# Patient Record
Sex: FEMALE | Race: WHITE | Employment: PART TIME | URBAN - METROPOLITAN AREA
[De-identification: names, ages, dates, MRNs, and addresses within clinical notes are randomized per-mention and may not be internally consistent; named-entity substitution may affect disease eponyms.]

---

## 2017-01-27 RX ORDER — NORETHINDRONE ACETATE AND ETHINYL ESTRADIOL 1MG-20(21)
1 KIT ORAL DAILY
Qty: 28 TAB | Refills: 0 | Status: SHIPPED | OUTPATIENT
Start: 2017-01-27 | End: 2017-02-20 | Stop reason: ALTCHOICE

## 2017-01-27 NOTE — TELEPHONE ENCOUNTER
This nurse called the patient to check on status of prescription. Patient advised of appointment and stated she can not do the 2/15/17 appointment. This nurse rescheduled the appointment for 2/14/17 at 9:20am. Prescription sent as per MD order to patient preferred pharmacy for one month supply. Patient advised of need to keep appointment in order to get more refills on her prescription. Patient verbalized understanding.

## 2017-01-27 NOTE — TELEPHONE ENCOUNTER
From: Denilson Ramey  To: Shelby Linares MD  Sent: 1/27/2017 8:48 AM EST  Subject: Medication Renewal Request    Original authorizing provider: MD Denilson Vargas would like a refill of the following medications:  norethindrone-ethinyl estradiol (LOESTRIN FE 1/20, 28-DAY,) 1 mg-20 mcg (21)/75 mg (7) tab Shelby Linares MD]    Preferred pharmacy: [facility] #[id number] - [address], [address] [facility],Third Floor:

## 2017-02-08 ENCOUNTER — TELEPHONE (OUTPATIENT)
Dept: OBGYN CLINIC | Age: 22
End: 2017-02-08

## 2017-02-08 NOTE — TELEPHONE ENCOUNTER
----- Message from Parth Ange sent at 5/12/2016  4:12 PM EDT -----  Regarding: Rpt Labs - June 2016  Notes Recorded by Ethel Lorenzo on 5/12/2016 at 4:12 PM  Read by Ama Degroot at 4/8/2016  6:40 PM    Notes Recorded by Don Manjarrez MD on 5/12/2016 at 2:11 PM  Please tickle for June - rpt labs. Notes Recorded by Don Manjarrez MD on 4/8/2016 at 6:38 PM  Results released to My Chart. \"Your labs look OK.  The DHEA-S is a little elevated which we can see if you are not ovulating. The testosterone levels are borderline but not elevated to the range that indicates a problem.  If you would like, we can repeat lab work in another couple of months to make sure things are stable. \"

## 2017-02-20 ENCOUNTER — OFFICE VISIT (OUTPATIENT)
Dept: OBGYN CLINIC | Age: 22
End: 2017-02-20

## 2017-02-20 VITALS
HEART RATE: 61 BPM | HEIGHT: 60 IN | BODY MASS INDEX: 20.81 KG/M2 | DIASTOLIC BLOOD PRESSURE: 59 MMHG | SYSTOLIC BLOOD PRESSURE: 102 MMHG | WEIGHT: 106 LBS

## 2017-02-20 DIAGNOSIS — Z01.419 ENCOUNTER FOR GYNECOLOGICAL EXAMINATION: Primary | ICD-10-CM

## 2017-02-20 DIAGNOSIS — L68.0 HIRSUTISM: ICD-10-CM

## 2017-02-20 DIAGNOSIS — Z30.41 SURVEILLANCE FOR BIRTH CONTROL, ORAL CONTRACEPTIVES: ICD-10-CM

## 2017-02-20 RX ORDER — ETONOGESTREL AND ETHINYL ESTRADIOL 11.7; 2.7 MG/1; MG/1
INSERT, EXTENDED RELEASE VAGINAL
Qty: 3 EACH | Refills: 4 | Status: SHIPPED | OUTPATIENT
Start: 2017-02-20 | End: 2017-03-06 | Stop reason: ALTCHOICE

## 2017-02-20 RX ORDER — ETONOGESTREL AND ETHINYL ESTRADIOL 11.7; 2.7 MG/1; MG/1
INSERT, EXTENDED RELEASE VAGINAL
Qty: 1 DEVICE | Refills: 0
Start: 2017-02-20 | End: 2017-03-06 | Stop reason: ALTCHOICE

## 2017-02-20 NOTE — PROGRESS NOTES
Annual exam ages 21-44    Gabe Turner is a ,  25 y.o. female [facility] Patient's last menstrual period was 2017., who presents for her annual checkup. Since her last annual GYN exam about one year ago on 16, she has had the following changes in her health history: None     Had Nexplanon 7/15 -> removed 2016 due to irregular bleeding. Switched to OCPs. Doing well, but does sometimes forget, may miss a day or two. Works at [employer] , shifts are variable. Currently in 3rd week of pill pack. H/o hirsutisism    ADDITIONAL CONCERNS:  She is having no significant problems. With regard to the Gardasil vaccine, she has not received it yet. Menstrual status:    Her periods are spotting/light in flow. She is using one to two pads or tampons per day, usually regular and last 26-30 days. She denies dysmenorrhea. She denies premenstrual symptoms. Contraception:    The current method of family planning is OCP (estrogen/progesterone). LE 1    Sexual history:     She  reports that she currently engages in sexual activity and has had male partners. She reports using the following method of birth control/protection: Pill. Pap and Mammogram History:    Has never had a pap smear. The patient has never had a mammogram.    Breast Cancer History/Substance Abuse: No [facility] w/BR [facility]    Past Medical History   Diagnosis Date    Asthma     Nexplanon removal      Inserted 2015 and Removed 2016     History reviewed. No pertinent past surgical history. OB History    Para Term  AB SAB TAB Ectopic Multiple Living   0 0 0 0 0 0 0 0 0 0             Current Outpatient Prescriptions   Medication Sig Dispense Refill    norethindrone-ethinyl estradiol (LOESTRIN FE , 28-DAY,) 1 mg-20 mcg (21)/75 mg (7) tab Take 1 Tab by mouth daily. 28 Tab 0     Allergies: Review of patient's allergies indicates no known allergies.    Social History     Social History    Marital status: SINGLE     Spouse name: N/A    Number of children: N/A    Years of education: N/A     Occupational History    Not on file. Social History Main Topics    Smoking status: Current Every Day Smoker     Types: Cigarettes    Smokeless tobacco: Never Used      Comment: Smokes 4-5 cigs per day - Uses vapor occasional     Alcohol use No    Drug use: No    Sexual activity: Yes     Partners: Male     Birth control/ protection: Pill     Other Topics Concern    Not on file     Social History Narrative     Tobacco History:  reports that she has been smoking Cigarettes. She has never used smokeless tobacco.  Alcohol Abuse:  reports that she does not drink alcohol. Drug Abuse:  reports that she does not use illicit drugs. There is no problem list on file for this patient.     Family History   Problem Relation Age of Onset    Hypertension Mother        Review of Systems - History obtained from the patient  Constitutional: negative for weight loss, fever, night sweats  HEENT: negative for hearing loss, earache, congestion, snoring, sorethroat  CV: negative for chest pain, palpitations, edema  Resp: negative for cough, shortness of breath, wheezing  GI: negative for change in bowel habits, abdominal pain, black or bloody stools  : negative for frequency, dysuria, hematuria, vaginal discharge  MSK: negative for back pain, joint pain, muscle pain  Breast: negative for breast lumps, nipple discharge, galactorrhea  Skin :negative for itching, rash, hives  Neuro: negative for dizziness, headache, confusion, weakness  Psych: negative for anxiety, depression, change in mood  Heme/lymph: negative for bleeding, bruising, pallor    Physical Exam    Visit Vitals    /59    Pulse 61    Ht 5' (1.524 m)    Wt 106 lb (48.1 kg)    LMP 02/18/2017  Comment: Spotting today     BMI 20.7 kg/m2       Constitutional  · Appearance: well-nourished, well developed, alert, in no acute distress    HENT  · Head and Face: appears normal    Neck  · Inspection/Palpation: normal appearance, no masses or tenderness  · Lymph Nodes: no lymphadenopathy present  · Thyroid: gland size normal, nontender, no nodules or masses present on palpation    Chest  · Respiratory Effort: breathing unlabored  · Auscultation: normal breath sounds    Cardiovascular  · Heart:  · Auscultation: regular rate and rhythm without murmur    Breasts  · Inspection of Breasts: breasts symmetrical, no skin changes, no discharge present, nipple appearance normal, no skin retraction present; some hair growth around nipples.   · Palpation of Breasts and Axillae: right - no masses present on palpation, no breast tenderness; left - no masses, mildly tender  · Axillary Lymph Nodes: no lymphadenopathy present    Gastrointestinal  · Abdominal Examination: abdomen non-tender to palpation, normal bowel sounds, no masses present  · Liver and spleen: no hepatomegaly present, spleen not palpable  · Hernias: no hernias identified    Genitourinary  · External Genitalia: normal appearance for age, no discharge present, no tenderness present, no inflammatory lesions present, no masses present, no atrophy present  · Vagina: normal vaginal vault without central or paravaginal defects, small amount dark brown discharge present c/w old blood, no inflammatory lesions present, no masses present  · Bladder: non-tender to palpation  · Urethra: appears normal  · Cervix: normal   · Uterus: normal size, shape and consistency  · Adnexa: left - no adnexal tenderness present, no adnexal masses present; right adnexal fullness/?mass ~3cm, mildly tender  · Perineum: perineum within normal limits, no evidence of trauma, no rashes or skin lesions present  · Anus: anus within normal limits, no hemorrhoids present  · Inguinal Lymph Nodes: no lymphadenopathy present    Skin  · General Inspection: no rash, no lesions identified    Neurologic/Psychiatric  · Mental Status:  · Orientation: grossly oriented to person, place and time  · Mood and Affect: mood normal, affect appropriate        Assessment & Plan:  · Routine gynecologic examination  · NuSwab screening <25yo. · Right adnexal mass -> pelvic US  · Hirsutism. On OCPs, but misses some pills. Switch to Nuvaring. Able to insert/remove. Sample x1 given.  [facility]3 RFx4. Begin in place of next pack. · Counseled re: diet, exercise, healthy lifestyle  · Return for yearly wellness visits  · Gardisil counseling provided  · Patient verbalized understanding    Orders Placed This Encounter    ethinyl estradiol-etonogestrel (NUVARING) 0.12-0.015 mg/24 hr vaginal ring     Sig: SAMPLE    LOT: [id number]   EXP: 2018  MF L1     Dispense:  1 Device     Refill:  0    ethinyl estradiol-etonogestrel (NUVARING) 0.12-0.015 mg/24 hr vaginal ring     Sig: Place ring vaginally and leave in place for 3 weeks, remove for 1 week. Place new ring every 28 days.      Dispense:  3 Each     Refill:  4

## 2017-02-20 NOTE — PATIENT INSTRUCTIONS

## 2017-02-23 LAB
C TRACH RRNA SPEC QL NAA+PROBE: NEGATIVE
N GONORRHOEA RRNA SPEC QL NAA+PROBE: NEGATIVE
T VAGINALIS RRNA SPEC QL NAA+PROBE: NEGATIVE

## 2017-03-06 NOTE — TELEPHONE ENCOUNTER
----- Message from Meme Andrade sent at 3/4/2017 10:03 AM EST -----  Regarding: RE: RE: Prescription Question  Contact: [phone]  Yes ma'am If that is possible. The NuvaRing was sort of uncomfortable during intercourse. Thank you!  ----- Message -----  From: Estefani Gonzales  Sent: 3/2/17, 9:14 AM  To: Meme Andrade  Subject: RE: Prescription Question    Good Morning,    Would you like to go back to Loestrin? Thanks,  NAGI Smith  (Dr. Rabago [facility] Nurse)       ----- Message -----     From: Mmee Andrade     Sent: 3/1/2017  6:58 PM EST       To: Lindsay Hennessy MD  Subject: Prescription Question    Hello, I have had the NuvaRing in for about a week and my spouse says he can feel it during intercourse. I have found nothing wrong with it yet, but I would like to switch back to my regular birth control pills thank you.

## 2018-01-30 ENCOUNTER — APPOINTMENT (OUTPATIENT)
Dept: GENERAL RADIOLOGY | Age: 23
End: 2018-01-30
Attending: PHYSICIAN ASSISTANT
Payer: MEDICAID

## 2018-01-30 ENCOUNTER — HOSPITAL ENCOUNTER (EMERGENCY)
Age: 23
Discharge: HOME | End: 2018-01-30
Attending: EMERGENCY MEDICINE
Payer: MEDICAID

## 2018-01-30 VITALS
BODY MASS INDEX: 20.62 KG/M2 | SYSTOLIC BLOOD PRESSURE: 105 MMHG | WEIGHT: 105 LBS | TEMPERATURE: 98.6 F | OXYGEN SATURATION: 99 % | RESPIRATION RATE: 16 BRPM | HEIGHT: 60 IN | DIASTOLIC BLOOD PRESSURE: 62 MMHG | HEART RATE: 63 BPM
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
## 2018-01-30 DIAGNOSIS — M54.2 NECK PAIN: ICD-10-CM

## 2018-01-30 LAB — HCG UR QL: NEGATIVE

## 2018-01-30 PROCEDURE — 74011250637 HC RX REV CODE- 250/637: Performed by: PHYSICIAN ASSISTANT

## 2018-01-30 PROCEDURE — 72050 X-RAY EXAM NECK SPINE 4/5VWS: CPT

## 2018-01-30 PROCEDURE — 81025 URINE PREGNANCY TEST: CPT

## 2018-01-30 PROCEDURE — 99283 EMERGENCY DEPT VISIT LOW MDM: CPT

## 2018-01-30 RX ORDER — METHOCARBAMOL 500 MG/1
500 TABLET, FILM COATED ORAL
Qty: 20 TAB | Refills: 0 | Status: SHIPPED | OUTPATIENT
Start: 2018-01-30

## 2018-01-30 RX ORDER — NAPROXEN 500 MG/1
500 TABLET ORAL 2 TIMES DAILY WITH MEALS
Qty: 20 TAB | Refills: 0 | Status: SHIPPED | OUTPATIENT
Start: 2018-01-30 | End: 2018-02-09

## 2018-01-30 RX ORDER — NAPROXEN 250 MG/1
500 TABLET ORAL
Status: COMPLETED | OUTPATIENT
Start: 2018-01-30 | End: 2018-01-30

## 2018-01-30 RX ADMIN — NAPROXEN 500 MG: 250 TABLET ORAL at 19:24
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
## 2020-02-22 ENCOUNTER — APPOINTMENT (OUTPATIENT)
Dept: CT IMAGING | Age: 25
End: 2020-02-22
Attending: EMERGENCY MEDICINE
Payer: MEDICAID

## 2020-02-22 ENCOUNTER — HOSPITAL ENCOUNTER (EMERGENCY)
Age: 25
Discharge: HOME | End: 2020-02-22
Attending: EMERGENCY MEDICINE | Admitting: EMERGENCY MEDICINE
Payer: MEDICAID

## 2020-02-22 VITALS
WEIGHT: 110 LBS | HEIGHT: 60 IN | BODY MASS INDEX: 21.6 KG/M2 | TEMPERATURE: 97.7 F | SYSTOLIC BLOOD PRESSURE: 118 MMHG | HEART RATE: 96 BPM | RESPIRATION RATE: 18 BRPM | OXYGEN SATURATION: 99 % | DIASTOLIC BLOOD PRESSURE: 64 MMHG

## 2020-02-22 DIAGNOSIS — R10.32 ABDOMINAL PAIN, LLQ (LEFT LOWER QUADRANT): Primary | ICD-10-CM

## 2020-02-22 LAB
ALBUMIN SERPL-MCNC: 4.5 G/DL (ref 3.5–5)
ALBUMIN/GLOB SERPL: 1.5 {RATIO} (ref 1.1–2.2)
ALP SERPL-CCNC: 63 U/L (ref 45–117)
ALT SERPL-CCNC: 15 U/L (ref 12–78)
ANION GAP SERPL CALC-SCNC: 4 MMOL/L (ref 5–15)
APPEARANCE UR: CLEAR
AST SERPL-CCNC: 12 U/L (ref 15–37)
BACTERIA URNS QL MICRO: NEGATIVE /HPF
BASOPHILS # BLD: 0 K/UL (ref 0–0.1)
BASOPHILS NFR BLD: 1 % (ref 0–1)
BILIRUB SERPL-MCNC: 0.6 MG/DL (ref 0.2–1)
BILIRUB UR QL: NEGATIVE
BUN SERPL-MCNC: 5 MG/DL (ref 6–20)
BUN/CREAT SERPL: 6 (ref 12–20)
CALCIUM SERPL-MCNC: 9 MG/DL (ref 8.5–10.1)
CHLORIDE SERPL-SCNC: 106 MMOL/L (ref 97–108)
CO2 SERPL-SCNC: 29 MMOL/L (ref 21–32)
COLOR UR: ABNORMAL
COMMENT, HOLDF: NORMAL
CREAT SERPL-MCNC: 0.86 MG/DL (ref 0.55–1.02)
DIFFERENTIAL METHOD BLD: ABNORMAL
EOSINOPHIL # BLD: 0.1 K/UL (ref 0–0.4)
EOSINOPHIL NFR BLD: 2 % (ref 0–7)
EPITH CASTS URNS QL MICRO: ABNORMAL /LPF
ERYTHROCYTE [DISTWIDTH] IN BLOOD BY AUTOMATED COUNT: 11.7 % (ref 11.5–14.5)
GLOBULIN SER CALC-MCNC: 3.1 G/DL (ref 2–4)
GLUCOSE SERPL-MCNC: 95 MG/DL (ref 65–100)
GLUCOSE UR STRIP.AUTO-MCNC: NEGATIVE MG/DL
HCG UR QL: NEGATIVE
HCG UR QL: NEGATIVE
HCT VFR BLD AUTO: 45.1 % (ref 35–47)
HGB BLD-MCNC: 15.3 G/DL (ref 11.5–16)
HGB UR QL STRIP: NEGATIVE
HYALINE CASTS URNS QL MICRO: ABNORMAL /LPF (ref 0–5)
IMM GRANULOCYTES # BLD AUTO: 0 K/UL (ref 0–0.04)
IMM GRANULOCYTES NFR BLD AUTO: 0 % (ref 0–0.5)
KETONES UR QL STRIP.AUTO: NEGATIVE MG/DL
LEUKOCYTE ESTERASE UR QL STRIP.AUTO: NEGATIVE
LIPASE SERPL-CCNC: 81 U/L (ref 73–393)
LYMPHOCYTES # BLD: 1.9 K/UL (ref 0.8–3.5)
LYMPHOCYTES NFR BLD: 37 % (ref 12–49)
MCH RBC QN AUTO: 31.6 PG (ref 26–34)
MCHC RBC AUTO-ENTMCNC: 33.9 G/DL (ref 30–36.5)
MCV RBC AUTO: 93.2 FL (ref 80–99)
MONOCYTES # BLD: 0.3 K/UL (ref 0–1)
MONOCYTES NFR BLD: 6 % (ref 5–13)
NEUTS SEG # BLD: 2.8 K/UL (ref 1.8–8)
NEUTS SEG NFR BLD: 54 % (ref 32–75)
NITRITE UR QL STRIP.AUTO: NEGATIVE
NRBC # BLD: 0 K/UL (ref 0–0.01)
NRBC BLD-RTO: 0 PER 100 WBC
PH UR STRIP: 7.5 [PH] (ref 5–8)
PLATELET # BLD AUTO: 218 K/UL (ref 150–400)
PMV BLD AUTO: 8.7 FL (ref 8.9–12.9)
POTASSIUM SERPL-SCNC: 3.8 MMOL/L (ref 3.5–5.1)
PROT SERPL-MCNC: 7.6 G/DL (ref 6.4–8.2)
PROT UR STRIP-MCNC: NEGATIVE MG/DL
RBC # BLD AUTO: 4.84 M/UL (ref 3.8–5.2)
RBC #/AREA URNS HPF: ABNORMAL /HPF (ref 0–5)
SAMPLES BEING HELD,HOLD: NORMAL
SODIUM SERPL-SCNC: 139 MMOL/L (ref 136–145)
SP GR UR REFRACTOMETRY: 1.01 (ref 1–1.03)
UR CULT HOLD, URHOLD: NORMAL
UROBILINOGEN UR QL STRIP.AUTO: 0.2 EU/DL (ref 0.2–1)
WBC # BLD AUTO: 5.1 K/UL (ref 3.6–11)
WBC URNS QL MICRO: ABNORMAL /HPF (ref 0–4)

## 2020-02-22 PROCEDURE — 85025 COMPLETE CBC W/AUTO DIFF WBC: CPT

## 2020-02-22 PROCEDURE — 74011636320 HC RX REV CODE- 636/320: Performed by: RADIOLOGY

## 2020-02-22 PROCEDURE — 36415 COLL VENOUS BLD VENIPUNCTURE: CPT

## 2020-02-22 PROCEDURE — 81025 URINE PREGNANCY TEST: CPT

## 2020-02-22 PROCEDURE — 80053 COMPREHEN METABOLIC PANEL: CPT

## 2020-02-22 PROCEDURE — 83690 ASSAY OF LIPASE: CPT

## 2020-02-22 PROCEDURE — 74177 CT ABD & PELVIS W/CONTRAST: CPT

## 2020-02-22 PROCEDURE — 81001 URINALYSIS AUTO W/SCOPE: CPT

## 2020-02-22 PROCEDURE — 99285 EMERGENCY DEPT VISIT HI MDM: CPT

## 2020-02-22 RX ADMIN — IOPAMIDOL 100 ML: 755 INJECTION, SOLUTION INTRAVENOUS at 19:33

## 2020-02-22 RX ADMIN — IOHEXOL 30 ML: 240 INJECTION, SOLUTION INTRATHECAL; INTRAVASCULAR; INTRAVENOUS; ORAL at 19:32

## 2020-02-22 NOTE — ED NOTES
[facility] contacted and asked to fax over mental health resource. Resources given and explained to patient and patient's grandmother.

## 2020-02-22 NOTE — ED NOTES
Patient has expressed severe discomfort at her IV site. IV line pulled out, and planned to place another IV prior to scheduled CT scan.

## 2020-02-22 NOTE — ED TRIAGE NOTES
C/o sharp lower and mid left quadrant abdominal pain and nausea since Wednesday. Took Mag citrate on Thursday night and Friday Am, but this has not relieved her pain. Family hx of chron's and diverticulitis.

## 2020-02-22 NOTE — ED NOTES
Notified Dr. Celio Robles that patient reports that in the past month, she has wished that she would go to sleep and not wake up. Patient denies having active thoughts or a plan to commit suicide.

## 2020-02-23 NOTE — ED NOTES
Verbal shift change report given to [address] (oncoming nurse) by Sagar Santiago (offgoing nurse). Report included the following information SBAR, Kardex, ED Summary, [facility] and Recent Results.